# Patient Record
Sex: MALE | Race: WHITE | Employment: FULL TIME | URBAN - METROPOLITAN AREA
[De-identification: names, ages, dates, MRNs, and addresses within clinical notes are randomized per-mention and may not be internally consistent; named-entity substitution may affect disease eponyms.]

---

## 2017-10-31 DIAGNOSIS — K21.9 GASTROESOPHAGEAL REFLUX DISEASE WITHOUT ESOPHAGITIS: ICD-10-CM

## 2017-10-31 DIAGNOSIS — R45.89 DEPRESSED AFFECT: ICD-10-CM

## 2017-10-31 DIAGNOSIS — I10 ESSENTIAL HYPERTENSION WITH GOAL BLOOD PRESSURE LESS THAN 140/90: ICD-10-CM

## 2017-10-31 RX ORDER — DOXEPIN HYDROCHLORIDE 50 MG/1
50 CAPSULE ORAL
Qty: 30 CAP | Refills: 11 | Status: CANCELLED | OUTPATIENT
Start: 2017-10-31

## 2017-10-31 RX ORDER — METOPROLOL SUCCINATE 50 MG/1
TABLET, EXTENDED RELEASE ORAL
Qty: 90 TAB | Refills: 1 | Status: CANCELLED | OUTPATIENT
Start: 2017-10-31

## 2017-11-01 NOTE — TELEPHONE ENCOUNTER
/Telephone  Received: Yesterday       Cathy Fernandes [facility] Front Office                     Merline Jasper pt's mother is requesting a call back in reference to maintenance  Medications necessary, new insurance effective December 1.2017.  Best contact number is [phone].

## 2017-11-07 ENCOUNTER — TELEPHONE (OUTPATIENT)
Dept: FAMILY MEDICINE CLINIC | Age: 37
End: 2017-11-07

## 2017-11-07 DIAGNOSIS — I10 ESSENTIAL HYPERTENSION WITH GOAL BLOOD PRESSURE LESS THAN 140/90: ICD-10-CM

## 2017-11-07 RX ORDER — METOPROLOL SUCCINATE 50 MG/1
TABLET, EXTENDED RELEASE ORAL
Qty: 90 TAB | Refills: 0 | Status: SHIPPED | OUTPATIENT
Start: 2017-11-07 | End: 2018-02-02 | Stop reason: SDUPTHER

## 2017-11-07 NOTE — TELEPHONE ENCOUNTER
I refilled metoprolol. He will not get withdrawal from not having doxepin. Have him follow up with us soon.

## 2017-11-07 NOTE — TELEPHONE ENCOUNTER
----- Message from Sally Ortiz sent at 11/1/2017  9:06 AM EDT -----  Regarding: Prescription Question  Contact: [phone]  Dr Marie Rosa,    I have received a email from you denying my refill request. Here is the explanation on why I sent the request.... September 2016, I took a new promotion as a  for [employer] in [employer]. And transferred there. July 2017, [employer] had made a drastic changes in management staffing company wide and was part of the lay off. My insurance with [employer] had cut on me the first of August. Since then, I had to monitor every refill i had to stay on top of my medicine without getting withdrawals on the medicine I am required to take. September, I took a big job as a  for [employer] And my health insurance won't be effective till first of December. Right now I'm almost completely out of metoprolol and doxepin. And feared I would get withdrawals from stopping the medicine due to insurance reason. Was hoping you would approve those 2 medicine I had requested till I get my insurance first of December to do a follow up with you.     Daryle Sly

## 2017-11-15 ENCOUNTER — OFFICE VISIT (OUTPATIENT)
Dept: FAMILY MEDICINE CLINIC | Age: 37
End: 2017-11-15

## 2017-11-15 VITALS
TEMPERATURE: 98.2 F | WEIGHT: 245 LBS | HEIGHT: 74 IN | OXYGEN SATURATION: 96 % | SYSTOLIC BLOOD PRESSURE: 122 MMHG | HEART RATE: 79 BPM | RESPIRATION RATE: 20 BRPM | BODY MASS INDEX: 31.44 KG/M2 | DIASTOLIC BLOOD PRESSURE: 82 MMHG

## 2017-11-15 DIAGNOSIS — G47.9 SLEEP DIFFICULTIES: Primary | ICD-10-CM

## 2017-11-15 DIAGNOSIS — F41.9 ANXIETY: ICD-10-CM

## 2017-11-15 DIAGNOSIS — K21.9 GASTROESOPHAGEAL REFLUX DISEASE WITHOUT ESOPHAGITIS: ICD-10-CM

## 2017-11-15 DIAGNOSIS — R45.89 DEPRESSED AFFECT: ICD-10-CM

## 2017-11-15 RX ORDER — ALPRAZOLAM 0.25 MG/1
0.25 TABLET ORAL
Qty: 20 TAB | Refills: 0 | Status: SHIPPED | OUTPATIENT
Start: 2017-11-15 | End: 2018-05-01 | Stop reason: ALTCHOICE

## 2017-11-15 RX ORDER — DOXEPIN HYDROCHLORIDE 50 MG/1
50 CAPSULE ORAL
Qty: 30 CAP | Refills: 11 | Status: SHIPPED | OUTPATIENT
Start: 2017-11-15 | End: 2018-03-21 | Stop reason: SDUPTHER

## 2017-11-15 RX ORDER — VORTIOXETINE 10 MG/1
TABLET, FILM COATED ORAL
Refills: 1 | COMMUNITY
Start: 2017-10-29 | End: 2017-12-04 | Stop reason: SDUPTHER

## 2017-11-15 NOTE — PROGRESS NOTES
Greater than 50% of today's 15 minute visit was counseling or coordination of care for the following reasons:    See diagnoses and orders, see patient instructions    Patient left the area for a while (in [place])  but is now back due to his job. Needs refills. PHQ9 = 8      We discussed health maintenance    BMI = Body mass index is 31.46 kg/(m^2). We discussed diet/exercise/appropriate weight loss (see letter)      we reviewed and updated pertinent past medical history in the problem list    I will refill needed medicines. He was previously seeing a psychiatrist (Dr. Adilson Metz). He needs CPx after 12/1/2017 when his new insurance kicks in    He has been out of xanax for several weeks. He still feels anxious occ. I will refill a limited number but I ask that he not take this daily    I have reviewed/discussed the above normal BMI with the patient. I have recommended the following interventions: encourage exercise . Sisi Retana

## 2017-11-15 NOTE — MR AVS SNAPSHOT
Visit Information Date & Time Provider Department Dept. Phone Encounter #  
 11/15/2017  1:00 PM Starlene Osgood, MD [address] [place] [address] [phone] [id number] Upcoming Health Maintenance Date Due DTaP/Tdap/Td series (2 - Td) 4/5/2021 Allergies as of 11/15/2017  Review Complete On: 11/15/2017 By: Chano Coleman LPN Severity Noted Reaction Type Reactions Ceclor [Cefaclor]  12/24/2010    Hives Current Immunizations  Reviewed on 9/15/2015 Name Date Influenza Vaccine (Quad) PF 10/30/2017 12:00 AM, 9/15/2015 Pneumococcal Polysaccharide (PPSV-23) 9/15/2015 Tdap 4/5/2011 Not reviewed this visit You Were Diagnosed With   
  
 Codes Comments Sleep difficulties    -  Primary ICD-10-CM: G47.9 ICD-9-CM: 780.50 Depressed affect     ICD-10-CM: R45.89 ICD-9-CM: 237 Gastroesophageal reflux disease without esophagitis     ICD-10-CM: K21.9 ICD-9-CM: 530.81 Anxiety     ICD-10-CM: F41.9 ICD-9-CM: 300.00 FTF 11/15/2017.  as expected Vitals BP Pulse Temp Resp Height(growth percentile) Weight(growth percentile) 122/82 (BP 1 Location: Right arm, BP Patient Position: Sitting) 79 98.2 °F (36.8 °C) (Oral) 20 6' 2\" (1.88 m) 245 lb (111.1 kg) SpO2 BMI Smoking Status 96% 31.46 kg/m2 Never Smoker Vitals History BMI and BSA Data Body Mass Index Body Surface Area  
 31.46 kg/m 2 2.41 m 2 Preferred Pharmacy Pharmacy Name Phone [id number] [address]  [phone] Your Updated Medication List  
  
   
This list is accurate as of: 11/15/17  1:28 PM.  Always use your most recent med list.  
  
  
  
  
 ALPRAZolam 0.25 mg tablet Commonly known as:  [place] Take 1 Tab by mouth every twelve (12) hours as needed for Anxiety. Max Daily Amount: 0.5 mg.  
  
 doxepin 50 mg capsule Commonly known as:  SINEquan Take 1 Cap by mouth nightly. hydroCHLOROthiazide 50 mg tablet Commonly known as:  HYDRODIURIL  
TAKE 1 TABLET BY MOUTH DAILY. lisinopril 20 mg tablet Commonly known as:  PRINIVIL, ZESTRIL  
TAKE 1 TABLET EVERY DAY  
  
 metoprolol succinate 50 mg XL tablet Commonly known as:  TOPROL-XL  
TAKE 1 TABLET BY MOUTH DAILY. TRINTELLIX 10 mg tablet Generic drug:  vortioxetine TAKE 1 TABLET BY MOUTH EVERY MORNING Prescriptions Printed Refills ALPRAZolam (XANAX) 0.25 mg tablet 0 Sig: Take 1 Tab by mouth every twelve (12) hours as needed for Anxiety. Max Daily Amount: 0.5 mg.  
 Class: Print Route: Oral  
  
Prescriptions Sent to Pharmacy Refills  
 doxepin (SINEQUAN) 50 mg capsule 11 Sig: Take 1 Cap by mouth nightly. Class: Normal  
 Pharmacy: [id number]- [address] [facility], [address] [place] [address] Dr Nagel #: [phone] Route: Oral  
  
Patient Instructions Refills given Try not to use alprazolam daily Follow up after December 1 for exam 
 
Focus on regular exercise (150 minutes each week) and healthy eating. Eat more fruits and vegetables. Eat more protein (egg whites, beans, and nuts you know you tolerate) and less carbohydrates (white bread, white rice, white pasta, white potatoes, sodas, and sweets). Eat appropriately small portion sizes. Introducing [facility]! Dear Kala Luciano: 
Thank you for requesting a [employer] account. Our records indicate that you already have an active [employer] account. You can access your account anytime at [email or web address]. [employer] Did you know that you can access your hospital and ER discharge instructions at any time in [employer]? You can also review all of your test results from your hospital stay or ER visit. Additional Information If you have questions, please visit the Frequently Asked Questions section of the [employer] website at [email or web address]. [employer]/. Remember, [email or web address] is NOT to be used for urgent needs. For medical emergencies, dial 911. Now available from your iPhone and Android! Please provide this summary of care documentation to your next provider. Your primary care clinician is listed as Jacqui Dick. If you have any questions after today's visit, please call [phone].

## 2017-11-15 NOTE — PATIENT INSTRUCTIONS
Refills given    Try not to use alprazolam daily    Follow up after December 1 for exam    Focus on regular exercise (150 minutes each week) and healthy eating. Eat more fruits and vegetables. Eat more protein (egg whites, beans, and nuts you know you tolerate) and less carbohydrates (white bread, white rice, white pasta, white potatoes, sodas, and sweets). Eat appropriately small portion sizes.

## 2017-12-04 DIAGNOSIS — R45.84 ANHEDONIA: Primary | ICD-10-CM

## 2017-12-04 RX ORDER — VORTIOXETINE 10 MG/1
TABLET, FILM COATED ORAL
Qty: 30 TAB | Refills: 1 | Status: SHIPPED | OUTPATIENT
Start: 2017-12-04 | End: 2018-02-01 | Stop reason: SDUPTHER

## 2017-12-09 DIAGNOSIS — I10 ESSENTIAL HYPERTENSION WITH GOAL BLOOD PRESSURE LESS THAN 140/90: ICD-10-CM

## 2017-12-11 RX ORDER — HYDROCHLOROTHIAZIDE 50 MG/1
TABLET ORAL
Qty: 90 TAB | Refills: 5 | Status: SHIPPED | OUTPATIENT
Start: 2017-12-11 | End: 2019-03-05 | Stop reason: SDUPTHER

## 2018-01-12 ENCOUNTER — HOSPITAL ENCOUNTER (EMERGENCY)
Age: 38
Discharge: HOME | End: 2018-01-12
Attending: STUDENT IN AN ORGANIZED HEALTH CARE EDUCATION/TRAINING PROGRAM | Admitting: STUDENT IN AN ORGANIZED HEALTH CARE EDUCATION/TRAINING PROGRAM
Payer: COMMERCIAL

## 2018-01-12 ENCOUNTER — APPOINTMENT (OUTPATIENT)
Dept: GENERAL RADIOLOGY | Age: 38
End: 2018-01-12
Attending: STUDENT IN AN ORGANIZED HEALTH CARE EDUCATION/TRAINING PROGRAM
Payer: COMMERCIAL

## 2018-01-12 VITALS
WEIGHT: 245 LBS | OXYGEN SATURATION: 97 % | HEIGHT: 76 IN | TEMPERATURE: 98.7 F | DIASTOLIC BLOOD PRESSURE: 83 MMHG | RESPIRATION RATE: 18 BRPM | BODY MASS INDEX: 29.83 KG/M2 | SYSTOLIC BLOOD PRESSURE: 124 MMHG | HEART RATE: 90 BPM

## 2018-01-12 DIAGNOSIS — R05.9 COUGH: Primary | ICD-10-CM

## 2018-01-12 DIAGNOSIS — M79.10 MYALGIA: ICD-10-CM

## 2018-01-12 LAB
ATRIAL RATE: 89 BPM
CALCULATED P AXIS, ECG09: 25 DEGREES
CALCULATED R AXIS, ECG10: 29 DEGREES
CALCULATED T AXIS, ECG11: 35 DEGREES
DIAGNOSIS, 93000: NORMAL
P-R INTERVAL, ECG05: 128 MS
Q-T INTERVAL, ECG07: 428 MS
QRS DURATION, ECG06: 96 MS
QTC CALCULATION (BEZET), ECG08: 520 MS
VENTRICULAR RATE, ECG03: 89 BPM

## 2018-01-12 PROCEDURE — 99284 EMERGENCY DEPT VISIT MOD MDM: CPT

## 2018-01-12 PROCEDURE — 71046 X-RAY EXAM CHEST 2 VIEWS: CPT

## 2018-01-12 PROCEDURE — 74011250637 HC RX REV CODE- 250/637: Performed by: PHYSICIAN ASSISTANT

## 2018-01-12 PROCEDURE — 93005 ELECTROCARDIOGRAM TRACING: CPT

## 2018-01-12 RX ORDER — ALBUTEROL SULFATE 90 UG/1
2 AEROSOL, METERED RESPIRATORY (INHALATION)
Qty: 1 INHALER | Refills: 0 | Status: SHIPPED | OUTPATIENT
Start: 2018-01-12 | End: 2018-03-21 | Stop reason: ALTCHOICE

## 2018-01-12 RX ORDER — ONDANSETRON 4 MG/1
8 TABLET, ORALLY DISINTEGRATING ORAL
Status: COMPLETED | OUTPATIENT
Start: 2018-01-12 | End: 2018-01-12

## 2018-01-12 RX ORDER — MINERAL OIL
180 ENEMA (ML) RECTAL DAILY
Qty: 30 TAB | Refills: 0 | Status: SHIPPED | OUTPATIENT
Start: 2018-01-12 | End: 2018-03-21 | Stop reason: ALTCHOICE

## 2018-01-12 RX ORDER — AZITHROMYCIN 250 MG/1
TABLET, FILM COATED ORAL
Qty: 6 TAB | Refills: 0 | Status: SHIPPED | OUTPATIENT
Start: 2018-01-12 | End: 2018-01-17

## 2018-01-12 RX ORDER — OXYMETAZOLINE HCL 0.05 %
2 SPRAY, NON-AEROSOL (ML) NASAL 2 TIMES DAILY
Qty: 1 EACH | Refills: 0 | Status: SHIPPED | OUTPATIENT
Start: 2018-01-12 | End: 2018-01-15

## 2018-01-12 RX ORDER — BENZONATATE 100 MG/1
100 CAPSULE ORAL
Qty: 30 CAP | Refills: 0 | Status: SHIPPED | OUTPATIENT
Start: 2018-01-12 | End: 2018-01-19

## 2018-01-12 RX ADMIN — ONDANSETRON 8 MG: 4 TABLET, ORALLY DISINTEGRATING ORAL at 15:47

## 2018-01-12 NOTE — ED NOTES
Patient has received discharge instructions from ER  PA, verbalizes understanding.   Ambulatory upon discharge with family

## 2018-01-12 NOTE — LETTER
[address] 
[address] [place] [address] [phone] [id number] 
[phone] Work/School Note Date: 1/12/2018 To Whom It May concern: 
 
Coralee Lujan was seen and treated today in the emergency room by the following provider(s): 
Attending Provider: Donna De Leon MD 
Physician Assistant: Prieto Amaral PA-C. Coralee Lujan may return to work on 1/14/18.  
 
Sincerely, 
 
 
 
 
Prieto Amaral PA-C

## 2018-01-12 NOTE — DISCHARGE INSTRUCTIONS
Cough: Care Instructions  Your Care Instructions    A cough is your body's response to something that bothers your throat or airways. Many things can cause a cough. You might cough because of a cold or the flu, bronchitis, or asthma. Smoking, postnasal drip, allergies, and stomach acid that backs up into your throat also can cause coughs. A cough is a symptom, not a disease. Most coughs stop when the cause, such as a cold, goes away. You can take a few steps at home to cough less and feel better. Follow-up care is a key part of your treatment and safety. Be sure to make and go to all appointments, and call your doctor if you are having problems. It's also a good idea to know your test results and keep a list of the medicines you take. How can you care for yourself at home? · Drink lots of water and other fluids. This helps thin the mucus and soothes a dry or sore throat. Honey or lemon juice in hot water or tea may ease a dry cough. · Take cough medicine as directed by your doctor. · Prop up your head on pillows to help you breathe and ease a dry cough. · Try cough drops to soothe a dry or sore throat. Cough drops don't stop a cough. Medicine-flavored cough drops are no better than candy-flavored drops or hard candy. · Do not smoke. Avoid secondhand smoke. If you need help quitting, talk to your doctor about stop-smoking programs and medicines. These can increase your chances of quitting for good. When should you call for help? Call 911 anytime you think you may need emergency care. For example, call if:  ? · You have severe trouble breathing. ?Call your doctor now or seek immediate medical care if:  ? · You cough up blood. ? · You have new or worse trouble breathing. ? · You have a new or higher fever. ? · You have a new rash. ? Watch closely for changes in your health, and be sure to contact your doctor if:  ? · You cough more deeply or more often, especially if you notice more mucus or a change in the color of your mucus. ? · You have new symptoms, such as a sore throat, an earache, or sinus pain. ? · You do not get better as expected. Where can you learn more? Go to http://hang-rizwan.info/. Enter D279 in the search box to learn more about \"Cough: Care Instructions. \"  Current as of: May 12, 2017  Content Version: 11.4  © 2530-8074 [employer]. Care instructions adapted under license by [employer] (which disclaims liability or warranty for this information). If you have questions about a medical condition or this instruction, always ask your healthcare professional. [place] [address] any warranty or liability for your use of this information. We hope that we have addressed all of your medical concerns. The examination and treatment you received in the Emergency Department were for an emergent problem and were not intended as complete care. It is important that you follow up with your healthcare provider(s) for ongoing care. If your symptoms worsen or do not improve as expected, and you are unable to reach your usual health care provider(s), you should return to the Emergency Department. Today's healthcare is undergoing tremendous change, and patient satisfaction surveys are one of the many tools to assess the quality of medical care. You may receive a survey from the [employer] organization regarding your experience in the Emergency Department. I hope that your experience has been completely positive, particularly the medical care that I provided. As such, please participate in the survey; anything less than excellent does not meet my expectations or intentions. [facility] and [facility] participate in nationally recognized quality of care measures.   If your blood pressure is greater than 120/80, as reported below, we urge that you seek medical care to address the potential of high blood pressure, commonly known as hypertension. Hypertension can be hereditary or can be caused by certain medical conditions, pain, stress, or \"white coat syndrome. \"       Please make an appointment with your health care provider(s) for follow up of your Emergency Department visit. VITALS:   Patient Vitals for the past 8 hrs:   Temp Pulse Resp BP SpO2   01/12/18 1403 98.3 °F (36.8 °C) (!) 107 18 102/68 97 %          Thank you for allowing us to provide you with medical care today. We realize that you have many choices for your emergency care needs. Please choose us in the future for any continued health care needs. Bertha Angeles Graciela, [address] [place] [address].   Office: [phone]            Recent Results (from the past 24 hour(s))   EKG, 12 LEAD, INITIAL    Collection Time: 01/12/18  2:16 PM   Result Value Ref Range    Ventricular Rate 89 BPM    Atrial Rate 89 BPM    P-R Interval 128 ms    QRS Duration 96 ms    Q-T Interval 428 ms    QTC Calculation (Bezet) 520 ms    Calculated P Axis 25 degrees    Calculated R Axis 29 degrees    Calculated T Axis 35 degrees    Diagnosis       Normal sinus rhythm  Prolonged QT  When compared with ECG of 08-JUN-2015 14:35,  QT has lengthened  Confirmed by Ian Guerrero M.D., [facility] ([id number]) on 1/12/2018 3:11:48 PM         Xr Chest Pa Lat    Result Date: 1/12/2018  EXAM:  XR CHEST PA LAT INDICATION:   cough/cp COMPARISON: 2015. FINDINGS: PA and lateral radiographs of the chest demonstrate clear lungs. The cardiac and mediastinal contours and pulmonary vascularity are normal.  The bones and soft tissues are within normal limits.      IMPRESSION: No acute process

## 2018-01-12 NOTE — ED PROVIDER NOTES
HPI Comments: 40 y.o. male with past medical history significant for meniere disease, anxiety, HTN, thyroid disease, depression, MRSA infection, bronchitis, and post-traumatic brain syndrome who presents from home via EMS with chief complaint of cough. Pt reports that he was dx with flu 6 days ago at [employer] [employer] and was started on Tamiflu. Pt does not report any improvement in his condition and, thus, presents to the ED here. The pt states that his symptoms have not improved and he wants something for the cough. Pt denies pleuritic chest pain, hemoptysis, unilateral leg swelling, hormone supplements, hx of blood clot, recent trauma/surgery, or hx of CA. There are no other acute medical concerns at this time. Social hx: Never smoker. No alcohol use. PCP: Marizol Orozco MD        The history is provided by the patient. No  was used. Past Medical History:   Diagnosis Date    Anxiety     Bronchitis     Depression     Hearing impaired person     Hx MRSA infection     Hypertension     Hypertension     Meniere disease     Post-traumatic brain syndrome     Thyroid disease        Past Surgical History:   Procedure Laterality Date    ABDOMEN SURGERY PROC UNLISTED  1999    nissen fundiplication    HX ORTHOPAEDIC      left knee surgery    MIDDLE EAR SURGERY PROC UNLISTED      MD ANESTH,ESOPHAGEAL SURGERY  1999    \"esophageal wrap\" for esophageal erosion         Family History:   Problem Relation Age of Onset    Hypertension Mother     Hypertension Father     Hypertension Paternal Uncle     Thyroid Disease Paternal Uncle     Thyroid Disease Maternal Grandmother     Diabetes Maternal Grandfather     Hypertension Paternal Grandmother     Cancer Other        Social History     Social History    Marital status: SINGLE     Spouse name: N/A    Number of children: N/A    Years of education: N/A     Occupational History    Not on file.      Social History Main Topics    Smoking status: Never Smoker    Smokeless tobacco: Never Used      Comment: social    Alcohol use No    Drug use: No    Sexual activity: Not Currently     Other Topics Concern    Not on file     Social History Narrative         ALLERGIES: Ceclor [cefaclor]    Review of Systems   Constitutional: Negative for chills, diaphoresis and fever. HENT: Negative for congestion, postnasal drip, rhinorrhea and sore throat. Eyes: Negative for photophobia, discharge, redness and visual disturbance. Respiratory: Positive for cough. Negative for chest tightness, shortness of breath and wheezing. Cardiovascular: Negative for chest pain, palpitations and leg swelling. Gastrointestinal: Negative for abdominal distention, abdominal pain, blood in stool, constipation, diarrhea, nausea and vomiting. Genitourinary: Negative for difficulty urinating, dysuria, frequency, hematuria and urgency. Musculoskeletal: Positive for myalgias (generalized body aches). Negative for arthralgias, back pain and joint swelling. Skin: Negative for color change and rash. Neurological: Negative for dizziness, speech difficulty, weakness, light-headedness, numbness and headaches. Psychiatric/Behavioral: Negative for confusion. The patient is not nervous/anxious. All other systems reviewed and are negative. Vitals:    01/12/18 1403 01/12/18 1549   BP: 102/68 124/83   Pulse: (!) 107 90   Resp: 18 18   Temp: 98.3 °F (36.8 °C) 98.7 °F (37.1 °C)   SpO2: 97% 97%   Weight: 111.1 kg (245 lb)    Height: 6' 4\" (1.93 m)             Physical Exam   Constitutional: He is oriented to person, place, and time. He appears well-developed and well-nourished. No distress. HENT:   Head: Normocephalic and atraumatic. Eyes: Conjunctivae are normal. Pupils are equal, round, and reactive to light. Neck: Normal range of motion. Neck supple. Cardiovascular: Normal rate, regular rhythm and normal heart sounds.     Pulmonary/Chest: Effort normal and breath sounds normal. No respiratory distress. He has no wheezes. Abdominal: Soft. Bowel sounds are normal. He exhibits no distension. There is no tenderness. Musculoskeletal: Normal range of motion. Neurological: He is alert and oriented to person, place, and time. Skin: Skin is warm. He is not diaphoretic. MDM  Number of Diagnoses or Management Options  Cough:   Myalgia:   Diagnosis management comments: Pt afebrile and non toxic appearing. Imaging unremarkable. Low index of suspicion for PE, PTX, PNA, or any other acute medical emergencies. Will treat symptomatically and advise close follow up with family doctor for further evaluation of symptoms. Reviewed treatment plan with attending and they agree.   Theresa Lindsay PA-C    ED Course       Procedures

## 2018-02-01 DIAGNOSIS — R45.84 ANHEDONIA: ICD-10-CM

## 2018-02-01 RX ORDER — VORTIOXETINE 10 MG/1
TABLET, FILM COATED ORAL
Qty: 30 TAB | Refills: 1 | Status: SHIPPED | OUTPATIENT
Start: 2018-02-01 | End: 2018-05-16 | Stop reason: SDUPTHER

## 2018-02-02 DIAGNOSIS — I10 ESSENTIAL HYPERTENSION WITH GOAL BLOOD PRESSURE LESS THAN 140/90: ICD-10-CM

## 2018-02-04 RX ORDER — LISINOPRIL 20 MG/1
TABLET ORAL
Qty: 90 TAB | Refills: 0 | Status: SHIPPED | OUTPATIENT
Start: 2018-02-04 | End: 2018-05-16 | Stop reason: SDUPTHER

## 2018-02-04 RX ORDER — METOPROLOL SUCCINATE 50 MG/1
TABLET, EXTENDED RELEASE ORAL
Qty: 90 TAB | Refills: 0 | Status: SHIPPED | OUTPATIENT
Start: 2018-02-04 | End: 2018-05-16 | Stop reason: SDUPTHER

## 2018-03-21 ENCOUNTER — OFFICE VISIT (OUTPATIENT)
Dept: FAMILY MEDICINE CLINIC | Age: 38
End: 2018-03-21

## 2018-03-21 VITALS
SYSTOLIC BLOOD PRESSURE: 113 MMHG | HEIGHT: 76 IN | OXYGEN SATURATION: 94 % | DIASTOLIC BLOOD PRESSURE: 73 MMHG | WEIGHT: 248 LBS | BODY MASS INDEX: 30.2 KG/M2 | HEART RATE: 74 BPM | RESPIRATION RATE: 20 BRPM | TEMPERATURE: 98.8 F

## 2018-03-21 DIAGNOSIS — Z00.00 HEALTH CARE MAINTENANCE: Primary | ICD-10-CM

## 2018-03-21 DIAGNOSIS — R45.89 DEPRESSED AFFECT: ICD-10-CM

## 2018-03-21 DIAGNOSIS — I10 ESSENTIAL HYPERTENSION: ICD-10-CM

## 2018-03-21 DIAGNOSIS — L40.9 PSORIASIS: ICD-10-CM

## 2018-03-21 DIAGNOSIS — Z13.31 SCREENING FOR DEPRESSION: ICD-10-CM

## 2018-03-21 DIAGNOSIS — F33.9 RECURRENT DEPRESSION (HCC): ICD-10-CM

## 2018-03-21 DIAGNOSIS — H81.03 COCHLEAR HYDROPS OF BOTH EARS: ICD-10-CM

## 2018-03-21 DIAGNOSIS — K21.9 GASTROESOPHAGEAL REFLUX DISEASE WITHOUT ESOPHAGITIS: ICD-10-CM

## 2018-03-21 DIAGNOSIS — Z13.220 LIPID SCREENING: ICD-10-CM

## 2018-03-21 DIAGNOSIS — G47.9 SLEEP DIFFICULTIES: ICD-10-CM

## 2018-03-21 DIAGNOSIS — Z23 ENCOUNTER FOR IMMUNIZATION: ICD-10-CM

## 2018-03-21 LAB
BILIRUB UR QL STRIP: NEGATIVE
GLUCOSE UR-MCNC: NEGATIVE MG/DL
KETONES P FAST UR STRIP-MCNC: NEGATIVE MG/DL
PH UR STRIP: 5.5 [PH] (ref 4.6–8)
PROT UR QL STRIP: NEGATIVE
SP GR UR STRIP: 1.01 (ref 1–1.03)
UA UROBILINOGEN AMB POC: NORMAL (ref 0.2–1)
URINALYSIS CLARITY POC: CLEAR
URINALYSIS COLOR POC: YELLOW
URINE BLOOD POC: NEGATIVE
URINE LEUKOCYTES POC: NEGATIVE
URINE NITRITES POC: NEGATIVE

## 2018-03-21 RX ORDER — HYDROCORTISONE 25 MG/ML
LOTION TOPICAL 2 TIMES DAILY
Qty: 118 ML | Refills: 3 | Status: SHIPPED | OUTPATIENT
Start: 2018-03-21

## 2018-03-21 RX ORDER — DOXEPIN HYDROCHLORIDE 50 MG/1
50 CAPSULE ORAL
Qty: 90 CAP | Refills: 3 | Status: SHIPPED | OUTPATIENT
Start: 2018-03-21 | End: 2018-12-25 | Stop reason: SDUPTHER

## 2018-03-21 NOTE — MR AVS SNAPSHOT
[address] 
[phone] Patient: Bashir Rey MRN: [id number] BZH:[birth year] Visit Information Date & Time Provider Department Dept. Phone Encounter #  
 3/21/2018  3:10 PM Taj Bishop MD [address] [facility] [phone] [id number] Upcoming Health Maintenance Date Due DTaP/Tdap/Td series (2 - Td) 4/5/2021 Allergies as of 3/21/2018  Review Complete On: 3/21/2018 By: Hayley Vernon LPN Severity Noted Reaction Type Reactions Ceclor [Cefaclor]  12/24/2010    Hives Current Immunizations  Reviewed on 9/15/2015 Name Date Influenza Vaccine (Quad) PF 10/30/2017 12:00 AM, 9/15/2015 Pneumococcal Polysaccharide (PPSV-23) 9/15/2015 Tdap 4/5/2011 Not reviewed this visit You Were Diagnosed With   
  
 Codes Comments Health care maintenance    -  Primary ICD-10-CM: Z00.00 ICD-9-CM: V70.0 Lipid screening     ICD-10-CM: T23.356 ICD-9-CM: V77.91 Recurrent depression ([facility] [address]     ICD-10-CM: F33.9 ICD-9-CM: 296.30 Essential hypertension     ICD-10-CM: I10 
ICD-9-CM: 401.9 Gastroesophageal reflux disease without esophagitis     ICD-10-CM: K21.9 ICD-9-CM: 530.81 Psoriasis     ICD-10-CM: L40.9 ICD-9-CM: 696.1 on clobetasol in past  
 Depressed affect     ICD-10-CM: R45.89 ICD-9-CM: 612 Sleep difficulties     ICD-10-CM: G47.9 ICD-9-CM: 780.50 Vitals BP Pulse Temp Resp Height(growth percentile) Weight(growth percentile) 113/73 (BP 1 Location: Right arm, BP Patient Position: Sitting) 74 98.8 °F (37.1 °C) (Oral) 20 6' 4\" (1.93 m) 248 lb (112.5 kg) SpO2 BMI Smoking Status 94% 30.19 kg/m2 Never Smoker Vitals History BMI and BSA Data Body Mass Index Body Surface Area  
 30.19 kg/m 2 2.46 m 2 Preferred Pharmacy Pharmacy Name Phone [facility] #[id number]  [phone] Your Updated Medication List  
  
   
This list is accurate as of 3/21/18  4:51 PM.  Always use your most recent med list.  
  
  
  
  
 ALPRAZolam 0.25 mg tablet Commonly known as:  Pedro Mohs Take 1 Tab by mouth every twelve (12) hours as needed for Anxiety. Max Daily Amount: 0.5 mg.  
  
 doxepin 50 mg capsule Commonly known as:  SINEquan Take 1 Cap by mouth nightly. hydroCHLOROthiazide 50 mg tablet Commonly known as:  HYDRODIURIL  
TAKE 1 TABLET BY MOUTH DAILY. hydrocortisone 2.5 % lotion Commonly known as:  HYTONE Apply  to affected area two (2) times a day. use thin layer once or twice daily  
  
 lisinopril 20 mg tablet Commonly known as:  PRINIVIL, ZESTRIL  
TAKE 1 TABLET EVERY DAY  
  
 metoprolol succinate 50 mg XL tablet Commonly known as:  TOPROL-XL  
TAKE 1 TABLET BY MOUTH EVERY DAY  
  
 TRINTELLIX 10 mg tablet Generic drug:  vortioxetine TAKE 1 TABLET BY MOUTH EVERY MORNING Prescriptions Sent to Pharmacy Refills  
 doxepin (SINEQUAN) 50 mg capsule 3 Sig: Take 1 Cap by mouth nightly. Class: Normal  
 [id number] [facility], [address] [place] [address] Dr Ph #: [phone] Route: Oral  
 hydrocortisone (HYTONE) 2.5 % lotion 3 Sig: Apply  to affected area two (2) times a day. use thin layer once or twice daily Class: Normal  
 Pharmacy: [id number] [id number] [facility], [address] [place] [address] Dr Ph #: [phone] Route: Topical  
  
We Performed the Following AMB POC URINALYSIS DIP STICK AUTO W/ MICRO [41942 CPT(R)] CBC W/O DIFF [01806 CPT(R)] LDL, DIRECT U518108 CPT(R)] METABOLIC PANEL, COMPREHENSIVE [59939 CPT(R)] TSH 3RD GENERATION [22350 CPT(R)] Patient Instructions In general, I advise patients to be as active as possible. I believe exercise is the key to long life and good health.   The current recommendation is for individuals to exercise for 150 minutes each week (in other words 30 minutes 5 days a week). Exercise should be vigorous enough to work up a sweat. These activities include brisk walking, running, tennis, swimming, weight-lifting, etc.  
 
I usually tell folks that work is work and exercise is exercise. Each of these activities has a different goal.  So build dedicated exercise time into your routine. Eat more protein (egg whites, beans, and nuts you know for sure that you tolerate) and less carbohydrates (white bread, white rice, white pasta, white potatoes, sodas, and sweets). Eat appropriately small portion sizes. You may also wish to look into the Mediterranean or the DASH Diet:  
 
If any patient drinks alcohol, I typically suggest that a male drink no more than 2 beers (or glasses of wine, or shots of liquor) in any 24 hour period (and not daily). For females, the limits are one drink per 24 hours (and not daily). After these limits, the toxic effects of alcohol consumption start to manifest.  
 
Avoid tobacco products. I can provide separate instructions for smoking cessation strategies if needed. I suggest a wellness exam yearly during your birth month to update health maintenance issues such as fasting labs, EKGs and other studies, appropriate cancer screenings,  immunizations, etc.   
 
I suggest a yearly flu shot If needed, please call Jayda Lee to help arrange and authorize any tests or referrals. Her # is [phone]. Tests at [facility] can be scheduled by calling #[phone]. try hytone lotion for psoriasis Call for refills Introducing [facility]! Dear Christian Guzman: 
Thank you for requesting a [employer] account. Our records indicate that you already have an active [employer] account. You can access your account anytime at [email or web address] Did you know that you can access your hospital and ER discharge instructions at any time in [employer]? You can also review all of your test results from your hospital stay or ER visit. Additional Information If you have questions, please visit the Frequently Asked Questions section of the [employer] website at [email or web address] Remember, [employer] is NOT to be used for urgent needs. For medical emergencies, dial 911. Now available from your iPhone and Android! Please provide this summary of care documentation to your next provider. Your primary care clinician is listed as Lorna Alford. If you have any questions after today's visit, please call [phone].

## 2018-03-21 NOTE — LETTER
3/21/2018 4:50 PM 
 
Mr. Amna Dunn [address] [phone] Body mass index is 30.19 kg/(m^2). Focus on regular exercise (150 minutes each week) and healthy eating. Eat more fruits and vegetables. Eat more protein (egg whites, beans, and nuts you know you tolerate) and less carbohydrates (white bread, white rice, white pasta, white potatoes, sodas, and sweets). Eat appropriately small portion sizes. Call when you need refills. Sincerely, Fredo Willis MD

## 2018-03-21 NOTE — ASSESSMENT & PLAN NOTE
This condition is managed by Specialist.  Dr. Grijalva Glimpse 10 mg tablet  (Taking) TAKE 1 TABLET BY MOUTH EVERY MORNING    doxepin (SINEQUAN) 50 mg capsule  (Taking) Take 1 Cap by mouth nightly. ALPRAZolam (XANAX) 0.25 mg tablet Take 1 Tab by mouth every twelve (12) hours as needed for Anxiety. Max Daily Amount: 0.5 mg. Other [address] [place] [address]     The patient is on no other behavioral health meds.         No results found for: NA, NAPOC, CREA, CREAPOC, CREATEXT, TSH, WBC, WBCT, WBCPOC, GPT, ALTPOC, ALT, SGOT, ASTPOC, GGT, LITHM, ATRPA, NORTR, TSHEXT

## 2018-03-21 NOTE — LETTER
3/22/2018 7:52 PM 
 
Mr. Karime Nogueira [address] [phone] Dear Karime Nogueira: Please find your most recent results below. Resulted Orders CBC W/O DIFF Result Value Ref Range WBC 5.9 3.4 - 10.8 x10E3/uL  
 RBC 5.28 4.14 - 5.80 x10E6/uL HGB 17.1 13.0 - 17.7 g/dL HCT 48.0 37.5 - 51.0 % MCV 91 79 - 97 fL  
 MCH 32.4 26.6 - 33.0 pg  
 MCHC 35.6 31.5 - 35.7 g/dL  
 RDW 13.1 12.3 - 15.4 % PLATELET 454 880 - 225 x10E3/uL Narrative Performed at:  [address]  [id number] : Geoffrey Sam MD, Phone:  [phone] METABOLIC PANEL, COMPREHENSIVE Result Value Ref Range Glucose 80 65 - 99 mg/dL BUN 8 6 - 20 mg/dL Creatinine 0.86 0.76 - 1.27 mg/dL GFR est non- >59 mL/min/1.73 GFR est  >59 mL/min/1.73  
 BUN/Creatinine ratio 9 9 - 20 Sodium 139 134 - 144 mmol/L Potassium 4.1 3.5 - 5.2 mmol/L Chloride 96 96 - 106 mmol/L  
 CO2 28 18 - 29 mmol/L Calcium 10.3 (H) 8.7 - 10.2 mg/dL Protein, total 6.9 6.0 - 8.5 g/dL Albumin 4.6 3.5 - 5.5 g/dL GLOBULIN, TOTAL 2.3 1.5 - 4.5 g/dL A-G Ratio 2.0 1.2 - 2.2 Bilirubin, total 0.7 0.0 - 1.2 mg/dL Alk. phosphatase 78 39 - 117 IU/L  
 AST (SGOT) 27 0 - 40 IU/L  
 ALT (SGPT) 45 (H) 0 - 44 IU/L Narrative Performed at:  [address]  [id number] : Geoffrey Sam MD, Phone:  [phone] TSH 3RD GENERATION Result Value Ref Range TSH 0.881 0.450 - 4.500 uIU/mL Narrative Performed at:  [address]  [id number] : Geoffrey Sam MD, Phone:  [phone] AMB POC URINALYSIS DIP STICK AUTO W/ MICRO Result Value Ref Range Color (UA POC) Yellow Clarity (UA POC) Clear Glucose (UA POC) Negative Negative Bilirubin (UA POC) Negative Negative Ketones (UA POC) Negative Negative Specific gravity (UA POC) 1.010 1.001 - 1.035 Blood (UA POC) Negative Negative pH (UA POC) 5.5 4.6 - 8.0 Protein (UA POC) Negative Negative Urobilinogen (UA POC) 0.2 mg/dL 0.2 - 1 Nitrites (UA POC) Negative Negative Leukocyte esterase (UA POC) Negative Negative LDL, DIRECT Result Value Ref Range LDL,Direct 135 (H) 0 - 99 mg/dL Narrative Performed at:  [address]  [id number] : Gael Cantu MD, Phone:  [phone] RECOMMENDATIONS: 
 
Your calcium is up a bit.  Avoid calcium supplements.  Recheck labs in 4 weeks. Your LDL (the \"bad cholesterol\") is creeping up. Focus on regular exercise (150 minutes each week) and healthy eating.  Eat more fruits and vegetables.  Eat more protein (egg whites, beans, and nuts you know you tolerate) and less carbohydrates (white bread, white rice, white pasta, white potatoes, sodas, and sweets).  Eat appropriately small portion sizes. Sincerely, Shellie Wolfe MD

## 2018-03-21 NOTE — PATIENT INSTRUCTIONS
In general, I advise patients to be as active as possible. I believe exercise is the key to long life and good health. The current recommendation is for individuals to exercise for 150 minutes each week (in other words 30 minutes 5 days a week). Exercise should be vigorous enough to work up a sweat. These activities include brisk walking, running, tennis, swimming, weight-lifting, etc.     I usually tell folks that work is work and exercise is exercise. Each of these activities has a different goal.  So build dedicated exercise time into your routine. Eat more protein (egg whites, beans, and nuts you know for sure that you tolerate) and less carbohydrates (white bread, white rice, white pasta, white potatoes, sodas, and sweets). Eat appropriately small portion sizes. You may also wish to look into the Mediterranean or the DASH Diet:     If any patient drinks alcohol, I typically suggest that a male drink no more than 2 beers (or glasses of wine, or shots of liquor) in any 24 hour period (and not daily). For females, the limits are one drink per 24 hours (and not daily). After these limits, the toxic effects of alcohol consumption start to manifest.     Avoid tobacco products. I can provide separate instructions for smoking cessation strategies if needed. I suggest a wellness exam yearly during your birth month to update health maintenance issues such as fasting labs, EKGs and other studies, appropriate cancer screenings,  immunizations, etc.      I suggest a yearly flu shot    If needed, please call [facility] to help arrange and authorize any tests or referrals. Her # is [phone]. Tests at [facility] can be scheduled by calling #[phone].     try hytone lotion for psoriasis    Call for refills

## 2018-03-21 NOTE — PROGRESS NOTES
Lizbet Power is a 40 y.o. male      Issues discussed today include:      WELLNESS 2018    Hearing screen:   Done, he has bilateral hearing aids  Vision screening:   Done 2018 Leisa Drivers Dr. Tash Mcmillan  Depression screening:   Done, PHQ9 = 3  Fall assessment:   done      We discussed health maintenance    BMI = Body mass index is 30.19 kg/(m^2). We discussed diet/exercise/healthy weight    We discussed avoiding tobacco products     We reviewed and updated pertinent past medical history in the problem list      Colonoscopy:  Age 49  TDAP:  2011  Pneumovax:  2015  PCV-13:  Rx 2018  Flu shot:  2017  Zostavax:  Consider age 49  Eye exam:  2018  EKG: On file    FTF for DME:  done:  Bilateral hearing aids  Advanced directive:  Full code  Specialists:  Vijay Mcmillan  ENT Graciela Gagnon      In general, I advise patients to be as active as possible. I believe exercise is the key to long life and good health. The current recommendation is for individuals to exercise for 150 minutes each week (in other words 30 minutes 5 days a week). Exercise should be vigorous enough to work up a sweat. These activities include brisk walking, running, tennis, swimming, weight-lifting, etc.     I usually tell folks that work is work and exercise is exercise. Each of these activities has a different goal.  Even though you may be active at work, it may not be aerobically adequate. So build dedicated exercise time into your weekly routine. If any patient drinks alcohol, I suggest that a male drink only 2 beers (or glasses of wine, or shots of liquor) in any 24 hour period ( and not daily). For females, the limits are one drink per 24 hours (and not daily). After these limits, the toxic effects of alcohol consumption start to manifest.     Avoid tobacco products. I may suggest specific smoking cessation instructions if needed.       I typically suggest a wellness exam yearly during your birth month to update health maintenance issues such as fasting labs, EKGs and other studies, appropriate cancer screenings,  female exams as appropriate, immunizations, etc.    I suggest a yearly flu shot    Please call Noemí Pruett to help arrange and authorize any tests or referrals. Her # is [phone]         Data reviewed or ordered today:  Non fasting labs    Other problems include:  Patient Active Problem List   Diagnosis Code    Anxiety F41.9    HTN (hypertension) I10    Obesity (BMI 30-39. 9) E66.9    Cochlear hydrops of both ears H81.03    GERD (gastroesophageal reflux disease) K21.9    Essential hypertension I10    Depressed affect R45.89    [place] (hard of hearing) H91.90    Chronic pain G89.29    Status post Nissen fundoplication K32.731    Recurrent depression (HCC) F33.9       Medications:  Current Outpatient Prescriptions   Medication Sig Dispense Refill    doxepin (SINEQUAN) 50 mg capsule Take 1 Cap by mouth nightly. 90 Cap 3    hydrocortisone (HYTONE) 2.5 % lotion Apply  to affected area two (2) times a day. use thin layer once or twice daily 118 mL 3    lisinopril (PRINIVIL, ZESTRIL) 20 mg tablet TAKE 1 TABLET EVERY DAY 90 Tab 0    metoprolol succinate (TOPROL-XL) 50 mg XL tablet TAKE 1 TABLET BY MOUTH EVERY DAY 90 Tab 0    TRINTELLIX 10 mg tablet TAKE 1 TABLET BY MOUTH EVERY MORNING 30 Tab 1    hydroCHLOROthiazide (HYDRODIURIL) 50 mg tablet TAKE 1 TABLET BY MOUTH DAILY. 90 Tab 5    ALPRAZolam (XANAX) 0.25 mg tablet Take 1 Tab by mouth every twelve (12) hours as needed for Anxiety. Max Daily Amount: 0.5 mg. 20 Tab 0       Allergies: Allergies   Allergen Reactions    Ceclor [Cefaclor] Hives       LMP:  No LMP for male patient. Social History     Social History    Marital status: SINGLE     Spouse name: N/A    Number of children: N/A    Years of education: N/A     Occupational History    Not on file.      Social History Main Topics    Smoking status: Never Smoker    Smokeless tobacco: Never Used      Comment: social    Alcohol use No    Drug use: No    Sexual activity: Not Currently     Other Topics Concern    Not on file     Social History Narrative         Family History   Problem Relation Age of Onset    Hypertension Mother     Hypertension Father     Hypertension Paternal Uncle     Thyroid Disease Paternal Uncle     Thyroid Disease Maternal Grandmother     Diabetes Maternal Grandfather     Hypertension Paternal Grandmother     Cancer Other      Other family history:  PGF prostate cancer    Meaningful use:  done      ROS:  Headaches:  no  Chest Pain:  no  SOB:  no  Fevers:  no  Falls:  no  anxiety/depression/losing interest in doing things that were previously enjoyed:  no. PHQ2 = 1, PHQ9=3  Other significant ROS:  [place]  Patient denied problems with:    vision, speaking/swallowing, Reflux/indigestion, Cough,Diarrhea/constipation,Problems passing or controlling urine,  Sexual function, Mood (anxiety/depression/losing interest in doing things that were previously enjoyed), Snoring/sleep apnea,Fatigue, Weight change, memory                                                         Any other Positive ROS include: GERD better after nissen    He feels like anxiety is stable.  as expected. Several BNZ Rx for around 3 months in mid 2017    Falls in the past 12 months:  no           Over the last year (or since your last visit):  Have you been diagnosed with heart attack, stroke, broken bones, or skin cancer = no    Exercise:  Needs more             Smoking history:  none                            Works as         No LMP for male patient.     Physical Exam  Visit Vitals    /73 (BP 1 Location: Right arm, BP Patient Position: Sitting)    Pulse 74    Temp 98.8 °F (37.1 °C) (Oral)    Resp 20    Ht 6' 4\" (1.93 m)    Wt 248 lb (112.5 kg)    SpO2 94%    BMI 30.19 kg/m2     BP Readings from Last 3 Encounters:   03/21/18 113/73   01/12/18 124/83   11/15/17 122/82 Constitutional:  Appears well,  No Acute Distress, Vitals noted  Psychiatric:   Affect normal, Alert and cooperative, Oriented to person/place/time    Eyes:   Pupils equally round and reactive, EOMI, conjunctiva clear, eyelids normal  ENT:   External ears and nose normal/lips, teeth=OK/gums normal,  Orophyarynx normal, bilateral hearing aids  Neck:   general inspection and Thyroid normal.  No abnormal cervical or supraclavicular nodes    Lungs:   clear to auscultation, good respiratory effort  Heart: Ausculation normal.  Regular rhythm. No cardiac murmurs. No carotid bruits or palpable thrills  Chest wall normal    Extremities:   without edema, good peripheral pulses  Skin:   Warm to palpation, without rashes, bruising, or suspicious lesions   Abdominal exam:  fairly normal.  Liver and spleen normal.  No bruits/masses/tenderness. Surgical scars noted          Assessment:    Patient Active Problem List   Diagnosis Code    Anxiety F41.9    HTN (hypertension) I10    Obesity (BMI 30-39. 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 Health care maintenance Z00.00 V70.0 CBC W/O DIFF   2. Lipid screening Z13.220 V77.91 LDL, DIRECT   3. Recurrent depression (HCC) F33.9 296.30    4. Essential hypertension E43 280.8 METABOLIC PANEL, COMPREHENSIVE      TSH 3RD GENERATION      AMB POC URINALYSIS DIP STICK AUTO W/ MICRO   5. Gastroesophageal reflux disease without esophagitis K21.9 530.81 doxepin (SINEQUAN) 50 mg capsule   6. Psoriasis L40.9 696.1 hydrocortisone (HYTONE) 2.5 % lotion    on clobetasol in past   7. Depressed affect R45.89 311 doxepin (SINEQUAN) 50 mg capsule   8.  Sleep difficulties G47.9 780.50 doxepin (SINEQUAN) 50 mg capsule       Plan:  Orders Placed This Encounter    CBC W/O DIFF    METABOLIC PANEL, COMPREHENSIVE    TSH 3RD GENERATION    LDL, DIRECT    AMB POC URINALYSIS DIP STICK AUTO W/ MICRO    doxepin (SINEQUAN) 50 mg capsule     Sig: Take 1 Cap by mouth nightly. Dispense:  90 Cap     Refill:  3    hydrocortisone (HYTONE) 2.5 % lotion     Sig: Apply  to affected area two (2) times a day. use thin layer once or twice daily     Dispense:  118 mL     Refill:  3       See patient instructions  There are no Patient Instructions on file for this visit. refresh note:  done    AVS Printed:  done      Diagnoses and all orders for this visit:    1. Health care maintenance  -     CBC W/O DIFF    2. Lipid screening  -     LDL, DIRECT    3. Recurrent depression ([facility] [address]  Assessment & Plan: This condition is managed by Specialist.  Dr. Cinthya Mohan 10 mg tablet  (Taking) TAKE 1 TABLET BY MOUTH EVERY MORNING    doxepin (SINEQUAN) 50 mg capsule  (Taking) Take 1 Cap by mouth nightly. ALPRAZolam (XANAX) 0.25 mg tablet Take 1 Tab by mouth every twelve (12) hours as needed for Anxiety. Max Daily Amount: 0.5 mg. Other [facility]     The patient is on no other behavioral health meds. No results found for: NA, NAPOC, CREA, CREAPOC, CREATEXT, TSH, WBC, WBCT, WBCPOC, GPT, ALTPOC, ALT, SGOT, ASTPOC, GGT, LITHM, ATRPA, NORTR, TSHEXT      4. Essential hypertension  -     METABOLIC PANEL, COMPREHENSIVE  -     TSH 3RD GENERATION  -     AMB POC URINALYSIS DIP STICK AUTO W/ MICRO    5. Gastroesophageal reflux disease without esophagitis  -     doxepin (SINEQUAN) 50 mg capsule; Take 1 Cap by mouth nightly. 6. Psoriasis  Comments:  on clobetasol in past  Orders:  -     hydrocortisone (HYTONE) 2.5 % lotion; Apply  to affected area two (2) times a day. use thin layer once or twice daily    7.  Depressed affect  -     doxepin (SINEQUAN) 50 mg capsule; Take 1 Cap by mouth nightly. 8. Sleep difficulties  -     doxepin (SINEQUAN) 50 mg capsule; Take 1 Cap by mouth nightly.

## 2018-03-22 DIAGNOSIS — E83.52 HYPERCALCEMIA: Primary | ICD-10-CM

## 2018-03-22 LAB
ALBUMIN SERPL-MCNC: 4.6 G/DL (ref 3.5–5.5)
ALBUMIN/GLOB SERPL: 2 {RATIO} (ref 1.2–2.2)
ALP SERPL-CCNC: 78 IU/L (ref 39–117)
ALT SERPL-CCNC: 45 IU/L (ref 0–44)
AST SERPL-CCNC: 27 IU/L (ref 0–40)
BILIRUB SERPL-MCNC: 0.7 MG/DL (ref 0–1.2)
BUN SERPL-MCNC: 8 MG/DL (ref 6–20)
BUN/CREAT SERPL: 9 (ref 9–20)
CALCIUM SERPL-MCNC: 10.3 MG/DL (ref 8.7–10.2)
CHLORIDE SERPL-SCNC: 96 MMOL/L (ref 96–106)
CO2 SERPL-SCNC: 28 MMOL/L (ref 18–29)
CREAT SERPL-MCNC: 0.86 MG/DL (ref 0.76–1.27)
ERYTHROCYTE [DISTWIDTH] IN BLOOD BY AUTOMATED COUNT: 13.1 % (ref 12.3–15.4)
GFR SERPLBLD CREATININE-BSD FMLA CKD-EPI: 112 ML/MIN/1.73
GFR SERPLBLD CREATININE-BSD FMLA CKD-EPI: 129 ML/MIN/1.73
GLOBULIN SER CALC-MCNC: 2.3 G/DL (ref 1.5–4.5)
GLUCOSE SERPL-MCNC: 80 MG/DL (ref 65–99)
HCT VFR BLD AUTO: 48 % (ref 37.5–51)
HGB BLD-MCNC: 17.1 G/DL (ref 13–17.7)
LDLC SERPL DIRECT ASSAY-MCNC: 135 MG/DL (ref 0–99)
MCH RBC QN AUTO: 32.4 PG (ref 26.6–33)
MCHC RBC AUTO-ENTMCNC: 35.6 G/DL (ref 31.5–35.7)
MCV RBC AUTO: 91 FL (ref 79–97)
PLATELET # BLD AUTO: 345 X10E3/UL (ref 150–379)
POTASSIUM SERPL-SCNC: 4.1 MMOL/L (ref 3.5–5.2)
PROT SERPL-MCNC: 6.9 G/DL (ref 6–8.5)
RBC # BLD AUTO: 5.28 X10E6/UL (ref 4.14–5.8)
SODIUM SERPL-SCNC: 139 MMOL/L (ref 134–144)
TSH SERPL DL<=0.005 MIU/L-ACNC: 0.88 UIU/ML (ref 0.45–4.5)
WBC # BLD AUTO: 5.9 X10E3/UL (ref 3.4–10.8)

## 2018-03-22 NOTE — PROGRESS NOTES
Your calcium is up a bit. Avoid calcium supplements. Recheck labs in 4 weeks. Your LDL (the \"bad cholesterol\") is creeping up. Focus on regular exercise (150 minutes each week) and healthy eating. Eat more fruits and vegetables. Eat more protein (egg whites, beans, and nuts you know you tolerate) and less carbohydrates (white bread, white rice, white pasta, white potatoes, sodas, and sweets). Eat appropriately small portion sizes.

## 2018-05-01 DIAGNOSIS — F41.9 ANXIETY: Primary | ICD-10-CM

## 2018-05-01 RX ORDER — HYDROXYZINE HYDROCHLORIDE 10 MG/1
10 TABLET, FILM COATED ORAL
Qty: 30 TAB | Refills: 0 | Status: SHIPPED | OUTPATIENT
Start: 2018-05-01 | End: 2018-05-11

## 2018-05-16 DIAGNOSIS — I10 ESSENTIAL HYPERTENSION WITH GOAL BLOOD PRESSURE LESS THAN 140/90: ICD-10-CM

## 2018-05-16 DIAGNOSIS — R45.84 ANHEDONIA: ICD-10-CM

## 2018-05-17 ENCOUNTER — TELEPHONE (OUTPATIENT)
Dept: FAMILY MEDICINE CLINIC | Age: 38
End: 2018-05-17

## 2018-05-17 RX ORDER — LISINOPRIL 20 MG/1
TABLET ORAL
Qty: 90 TAB | Refills: 0 | Status: SHIPPED | OUTPATIENT
Start: 2018-05-17 | End: 2018-10-30 | Stop reason: SDUPTHER

## 2018-05-17 RX ORDER — METOPROLOL SUCCINATE 50 MG/1
50 TABLET, EXTENDED RELEASE ORAL DAILY
Qty: 90 TAB | Refills: 0 | Status: SHIPPED | OUTPATIENT
Start: 2018-05-17 | End: 2018-10-30 | Stop reason: SDUPTHER

## 2018-05-17 RX ORDER — METOPROLOL SUCCINATE 50 MG/1
TABLET, EXTENDED RELEASE ORAL
Qty: 90 TAB | Refills: 0 | Status: SHIPPED | OUTPATIENT
Start: 2018-05-17 | End: 2018-05-17

## 2018-05-17 RX ORDER — LISINOPRIL 20 MG/1
TABLET ORAL
Qty: 90 TAB | Refills: 0 | Status: SHIPPED | OUTPATIENT
Start: 2018-05-17 | End: 2018-05-17

## 2018-07-05 DIAGNOSIS — R45.84 ANHEDONIA: ICD-10-CM

## 2018-07-05 RX ORDER — VORTIOXETINE 10 MG/1
TABLET, FILM COATED ORAL
Qty: 30 TAB | Refills: 1 | Status: SHIPPED | OUTPATIENT
Start: 2018-07-05 | End: 2018-12-25 | Stop reason: SDUPTHER

## 2018-07-05 NOTE — TELEPHONE ENCOUNTER
Patient needs a refill of the following and is requesting a 90-day supply  Requested Prescriptions     Pending Prescriptions Disp Refills    vortioxetine (TRINTELLIX) 10 mg tablet 30 Tab 1     Sig: Take 1 Tab by mouth daily.

## 2018-10-30 DIAGNOSIS — I10 ESSENTIAL HYPERTENSION WITH GOAL BLOOD PRESSURE LESS THAN 140/90: ICD-10-CM

## 2018-10-30 RX ORDER — METOPROLOL SUCCINATE 50 MG/1
TABLET, EXTENDED RELEASE ORAL
Qty: 90 TAB | Refills: 0 | Status: SHIPPED | OUTPATIENT
Start: 2018-10-30 | End: 2018-12-25 | Stop reason: SDUPTHER

## 2018-10-30 RX ORDER — LISINOPRIL 20 MG/1
TABLET ORAL
Qty: 90 TAB | Refills: 0 | Status: SHIPPED | OUTPATIENT
Start: 2018-10-30 | End: 2018-12-25 | Stop reason: SDUPTHER

## 2018-12-25 DIAGNOSIS — K21.9 GASTROESOPHAGEAL REFLUX DISEASE WITHOUT ESOPHAGITIS: ICD-10-CM

## 2018-12-25 DIAGNOSIS — R45.89 DEPRESSED AFFECT: ICD-10-CM

## 2018-12-25 DIAGNOSIS — R45.84 ANHEDONIA: ICD-10-CM

## 2018-12-25 DIAGNOSIS — G47.9 SLEEP DIFFICULTIES: ICD-10-CM

## 2018-12-25 DIAGNOSIS — I10 ESSENTIAL HYPERTENSION WITH GOAL BLOOD PRESSURE LESS THAN 140/90: ICD-10-CM

## 2018-12-27 RX ORDER — METOPROLOL SUCCINATE 50 MG/1
50 TABLET, EXTENDED RELEASE ORAL DAILY
Qty: 90 TAB | Refills: 0 | Status: SHIPPED | OUTPATIENT
Start: 2018-12-27

## 2018-12-27 RX ORDER — LISINOPRIL 20 MG/1
TABLET ORAL
Qty: 90 TAB | Refills: 0 | Status: SHIPPED | OUTPATIENT
Start: 2018-12-27 | End: 2019-04-08 | Stop reason: SDUPTHER

## 2018-12-27 RX ORDER — DOXEPIN HYDROCHLORIDE 50 MG/1
50 CAPSULE ORAL
Qty: 90 CAP | Refills: 0 | Status: SHIPPED | OUTPATIENT
Start: 2018-12-27 | End: 2019-04-08 | Stop reason: SDUPTHER

## 2019-01-22 ENCOUNTER — OFFICE VISIT (OUTPATIENT)
Dept: FAMILY MEDICINE CLINIC | Age: 39
End: 2019-01-22

## 2019-01-22 VITALS
SYSTOLIC BLOOD PRESSURE: 115 MMHG | WEIGHT: 250 LBS | HEIGHT: 76 IN | BODY MASS INDEX: 30.44 KG/M2 | HEART RATE: 101 BPM | DIASTOLIC BLOOD PRESSURE: 64 MMHG | OXYGEN SATURATION: 97 % | TEMPERATURE: 98.6 F | RESPIRATION RATE: 16 BRPM

## 2019-01-22 DIAGNOSIS — F33.41 RECURRENT MAJOR DEPRESSIVE DISORDER, IN PARTIAL REMISSION (HCC): ICD-10-CM

## 2019-01-22 DIAGNOSIS — R00.0 TACHYCARDIA: ICD-10-CM

## 2019-01-22 DIAGNOSIS — R45.84 ANHEDONIA: Primary | ICD-10-CM

## 2019-01-22 NOTE — PROGRESS NOTES
[address] [place] [address]    Subjective:     CC: Medication refill  History provided by patient and chart review    HPI:  Here for medication refill of Vortiexetine. Medication was prescribed by Psychiatrist Dr. Shannan Choudhary in 2016 for anhedonia after a long bout of anxiety and depression. Has not seen Dr. Shannan Choudhary since then. Symptoms are much improved on medication. still with intermittent episodes of anxiety which are manageable. Also on Doxepin for sleep which helps. No suicidal or homicidal ideation. Past Medical History:   Diagnosis Date    Anxiety     Bronchitis     Depression     Hearing impaired person     Hx MRSA infection     Hypertension     Hypertension     Meniere disease     Post-traumatic brain syndrome     Thyroid disease      Allergies   Allergen Reactions    Ceclor [Cefaclor] Hives     Current Outpatient Medications on File Prior to Visit   Medication Sig Dispense Refill    doxepin (SINEQUAN) 50 mg capsule Take 1 Cap by mouth nightly. 90 Cap 0    vortioxetine (TRINTELLIX) 10 mg tablet Take 1 Tab by mouth daily. 90 Tab 0    metoprolol succinate (TOPROL-XL) 50 mg XL tablet Take 1 Tab by mouth daily. 90 Tab 0    lisinopril (PRINIVIL, ZESTRIL) 20 mg tablet TAKE 1 TABLET BY MOUTH EVERY DAY 90 Tab 0    hydrocortisone (HYTONE) 2.5 % lotion Apply  to affected area two (2) times a day. use thin layer once or twice daily 118 mL 3    hydroCHLOROthiazide (HYDRODIURIL) 50 mg tablet TAKE 1 TABLET BY MOUTH DAILY. 90 Tab 5     No current facility-administered medications on file prior to visit.       Family History   Problem Relation Age of Onset    Hypertension Mother     Hypertension Father     Hypertension Paternal Uncle     Thyroid Disease Paternal Uncle     Thyroid Disease Maternal Grandmother     Diabetes Maternal Grandfather     Hypertension Paternal Grandmother     Cancer Other      Social History     Socioeconomic History    Marital status: SINGLE     Spouse name: Not on file  Number of children: Not on file    Years of education: Not on file    Highest education level: Not on file   Tobacco Use    Smoking status: Never Smoker    Smokeless tobacco: Never Used    Tobacco comment: social   Substance and Sexual Activity    Alcohol use: No     Alcohol/week: 0.0 oz    Drug use: No    Sexual activity: Not Currently     Past Surgical History:   Procedure Laterality Date    ABDOMEN SURGERY PROC UNLISTED  1999    nissen fundiplication    HX ORTHOPAEDIC      left knee surgery    MIDDLE EAR SURGERY PROC UNLISTED      PA ANESTH,ESOPHAGEAL SURGERY  1999    \"esophageal wrap\" for esophageal erosion       Patient Active Problem List   Diagnosis Code    Anxiety F41.9    HTN (hypertension) I10    Obesity (BMI 30-39. 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 Objective:     Visit Vitals  /64   Pulse (!) 101   Temp 98.6 °F (37 °C)   Resp 16   Ht 6' 4\" (1.93 m)   Wt 250 lb (113.4 kg)   SpO2 97%   BMI 30.43 kg/m²        Physical Exam    Pertinent Labs/Studies:      Assessment and orders:     Diagnoses and all orders for this visit:    Anhedonia        -     Refilled Vortiexetine. Encouraged psych follow-up  -     vortioxetine (TRINTELLIX) 10 mg tablet; Take 1 Tab by mouth daily. , Normal, Disp-90 Tab, R-0  -     REFERRAL TO PSYCHIATRY    Recurrent MDD with Anxiety         -     Refilled Trintyellix. Encouraged psych f/u    Tachycardia         -     Mild. Pt reports being mildly anxious at visit         -     Likely secondary to anxiety.  Asymptomatic         -     No further work-up necessary      I have reviewed patient medical and social history and medications. I have reviewed pertinent labs results and other data. I have discussed the diagnosis with the patient and the intended plan as seen in the above orders. The patient has received an after-visit summary and questions were answered concerning future plans. I have discussed medication side effects and warnings with the patient as well.     Yusra Nuno MD  Resident [address] [place]  01/22/19    Patient discussed with Dr. Harrell Snellen, Attending Physician

## 2019-01-25 DIAGNOSIS — R45.84 ANHEDONIA: ICD-10-CM

## 2019-01-25 NOTE — PROGRESS NOTES
[address] Medicine Residency Attending Addendum:  Patient encounter was discussed on the day of the encounter with Yusra Nuno MD, performing the key elements of the service. I discussed the findings, assessment and plan with the resident and agree with the resident's findings and plan as documented in the resident's note.       Zuri Baez MD, CAQSM, RMSK

## 2019-01-25 NOTE — TELEPHONE ENCOUNTER
Prior authorization completed for Trintellix. Approved for 1 yr. Pt notified vi a telephone call.    ----- Message from Clark Valverde sent at 1/25/2019  2:04 PM EST -----  Regarding: Dr. Shraddha Kumar   Pt is requesting a call back regarding needing Dr's authorization for his insurance for Rx Trinteline at ([address] on file) in order for pharmacy to fill it. Best contact is [phone].

## 2019-03-05 DIAGNOSIS — I10 ESSENTIAL HYPERTENSION WITH GOAL BLOOD PRESSURE LESS THAN 140/90: ICD-10-CM

## 2019-03-08 RX ORDER — HYDROCHLOROTHIAZIDE 50 MG/1
TABLET ORAL
Qty: 30 TAB | Refills: 0 | Status: SHIPPED | OUTPATIENT
Start: 2019-03-08

## 2019-08-03 DIAGNOSIS — R45.89 DEPRESSED AFFECT: ICD-10-CM

## 2019-08-03 DIAGNOSIS — G47.9 SLEEP DIFFICULTIES: ICD-10-CM

## 2019-08-03 DIAGNOSIS — K21.9 GASTROESOPHAGEAL REFLUX DISEASE WITHOUT ESOPHAGITIS: ICD-10-CM

## 2019-08-04 RX ORDER — DOXEPIN HYDROCHLORIDE 50 MG/1
CAPSULE ORAL
Qty: 90 CAP | Refills: 0 | Status: SHIPPED | OUTPATIENT
Start: 2019-08-04

## 2021-01-01 NOTE — ED TRIAGE NOTES
Pt was diagnosed with flu last Saturday at [facility] & was started on tamfilu but pt reports not feeling any better. Afebrile.
No